# Patient Record
Sex: FEMALE | Race: WHITE | URBAN - NONMETROPOLITAN AREA
[De-identification: names, ages, dates, MRNs, and addresses within clinical notes are randomized per-mention and may not be internally consistent; named-entity substitution may affect disease eponyms.]

---
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
## 2024-10-02 ENCOUNTER — PROCEDURE VISIT (OUTPATIENT)
Dept: OBGYN CLINIC | Age: 19
End: 2024-10-02
Payer: COMMERCIAL

## 2024-10-02 ENCOUNTER — HOSPITAL ENCOUNTER (OUTPATIENT)
Age: 19
Setting detail: SPECIMEN
Discharge: HOME | End: 2024-10-02

## 2024-10-02 ENCOUNTER — TELEPHONE (OUTPATIENT)
Dept: OBGYN CLINIC | Age: 19
End: 2024-10-02

## 2024-10-02 VITALS — WEIGHT: 140 LBS | SYSTOLIC BLOOD PRESSURE: 112 MMHG | BODY MASS INDEX: 23.3 KG/M2 | DIASTOLIC BLOOD PRESSURE: 72 MMHG

## 2024-10-02 DIAGNOSIS — N92.0 MENORRHAGIA WITH REGULAR CYCLE: ICD-10-CM

## 2024-10-02 DIAGNOSIS — Z01.419 WOMEN'S ANNUAL ROUTINE GYNECOLOGICAL EXAMINATION: Primary | ICD-10-CM

## 2024-10-02 PROCEDURE — 99395 PREV VISIT EST AGE 18-39: CPT | Performed by: NURSE PRACTITIONER

## 2024-10-02 PROCEDURE — G8484 FLU IMMUNIZE NO ADMIN: HCPCS | Performed by: NURSE PRACTITIONER

## 2024-10-02 RX ORDER — ESCITALOPRAM OXALATE 5 MG/1
5 TABLET ORAL DAILY
COMMUNITY
Start: 2024-07-11

## 2024-10-02 RX ORDER — BUSPIRONE HYDROCHLORIDE 5 MG/1
5 TABLET ORAL 3 TIMES DAILY PRN
COMMUNITY
Start: 2024-03-15

## 2024-10-02 ASSESSMENT — ENCOUNTER SYMPTOMS
ABDOMINAL PAIN: 0
CONSTIPATION: 0
DIARRHEA: 0
SHORTNESS OF BREATH: 0

## 2024-10-02 NOTE — PROGRESS NOTES
YEARLY PHYSICAL    Date of service: 10/2/2024    Trevor De La Cruz  Is a 19 y.o. female    PT's PCP is: Gurvinder Lopez DO     : 2005                                         Chaperone for Intimate Exam  Chaperone was offered as part of the rooming process. Patient declined and agrees to continue with exam without a chaperone.  Chaperone: n/a      Subjective:       Patient's last menstrual period was 2024 (approximate).     Are your menses regular: yes    OB History    Para Term  AB Living   0 0 0 0 0 0   SAB IAB Ectopic Molar Multiple Live Births   0 0 0 0 0 0        Social History     Tobacco Use   Smoking Status Never   Smokeless Tobacco Never        Social History     Substance and Sexual Activity   Alcohol Use Never       Family History   Problem Relation Age of Onset    Diabetes Maternal Grandmother     Hypertension Maternal Grandmother     Stroke Maternal Grandmother     Heart Disease Maternal Grandfather     Breast Cancer Maternal Aunt     Colon Cancer Maternal Aunt     Diabetes Maternal Aunt        Any family history of breast or ovarian cancer: Yes    Any family history of blood clots: No      Allergies: Patient has no known allergies.      Current Outpatient Medications:     escitalopram (LEXAPRO) 5 MG tablet, Take 1 tablet by mouth daily, Disp: , Rfl:     busPIRone (BUSPAR) 5 MG tablet, Take 1 tablet by mouth 3 times daily as needed, Disp: , Rfl:     NEXPLANON 68 MG implant, , Disp: , Rfl:     Social History     Substance and Sexual Activity   Sexual Activity Yes    Partners: Male    Birth control/protection: Condom       Any bleeding or pain with intercourse: No    Last Yearly:  First exam    Last pap: n/a    Last HPV: n/a    Last Mammogram: n/a    Last Dexascan n/a    Last colorectal screen- n/a    Do you do self breast exams: encouraged     History reviewed. No pertinent past medical history.    History

## 2024-10-02 NOTE — TELEPHONE ENCOUNTER
Patient desires replacement Nexplanon. Told her to complete hcg either way since will be close to expiration. Current device expires 10/26.

## 2024-10-14 LAB — CYTOLOGY REPORT: NORMAL

## 2024-10-16 NOTE — TELEPHONE ENCOUNTER
Called [facility] and spoke with Hamzah. He states covered 100 percent with no copay through patients pharmacy benefits. He states it was sent to [facility] on 10/2/24.

## 2024-10-24 NOTE — TELEPHONE ENCOUNTER
Called [facility] to check status of order. I spoke with Chely who contacted pt and got verbal authorization for shipment to our office. Device is scheduled to be delivered on 10/29/24.

## 2024-10-29 NOTE — TELEPHONE ENCOUNTER
Nexplanon device arrived in office. I spoke with pt and she is aware. She states she is due to start cycle soon and would like to wait to have remove/replace until she starts next cycle. She will call on cycle day 1. She is also aware of need for SPT the day prior to remove/replace.   negative...

## 2024-11-19 ENCOUNTER — HOSPITAL ENCOUNTER (OUTPATIENT)
Age: 19
Setting detail: SPECIMEN
Discharge: HOME | End: 2024-11-19

## 2024-11-19 DIAGNOSIS — N92.0 MENORRHAGIA WITH REGULAR CYCLE: ICD-10-CM

## 2024-11-19 LAB — B-HCG SERPL EIA 3RD IS-ACNC: <0.2 MIU/ML

## 2024-11-20 ENCOUNTER — PROCEDURE VISIT (OUTPATIENT)
Dept: OBGYN CLINIC | Age: 19
End: 2024-11-20
Payer: COMMERCIAL

## 2024-11-20 VITALS
WEIGHT: 142.6 LBS | DIASTOLIC BLOOD PRESSURE: 68 MMHG | SYSTOLIC BLOOD PRESSURE: 106 MMHG | BODY MASS INDEX: 23.76 KG/M2 | HEIGHT: 65 IN

## 2024-11-20 DIAGNOSIS — N92.0 MENORRHAGIA WITH REGULAR CYCLE: Primary | ICD-10-CM

## 2024-11-20 DIAGNOSIS — Z30.46 ENCOUNTER FOR REMOVAL AND REINSERTION OF NEXPLANON: ICD-10-CM

## 2024-11-20 PROCEDURE — 11983 REMOVE/INSERT DRUG IMPLANT: CPT | Performed by: NURSE PRACTITIONER

## 2024-11-20 RX ORDER — MELOXICAM 15 MG/1
TABLET ORAL
COMMUNITY
Start: 2024-10-23

## 2024-11-20 RX ORDER — LIDOCAINE HYDROCHLORIDE AND EPINEPHRINE BITARTRATE 20; .01 MG/ML; MG/ML
1.7 INJECTION, SOLUTION SUBCUTANEOUS ONCE
Status: COMPLETED | OUTPATIENT
Start: 2024-11-20 | End: 2024-11-20

## 2024-11-20 RX ADMIN — LIDOCAINE HYDROCHLORIDE AND EPINEPHRINE BITARTRATE 1.7 ML: 20; .01 INJECTION, SOLUTION SUBCUTANEOUS at 08:39

## 2024-11-20 RX ADMIN — LIDOCAINE HYDROCHLORIDE AND EPINEPHRINE BITARTRATE 1.7 ML: 20; .01 INJECTION, SOLUTION SUBCUTANEOUS at 08:40

## 2024-11-20 NOTE — PROGRESS NOTES
was intact and visualized by patient and I.  A sterile dressing and steri-strip was applied with a pressure wrap. The patient tolerated the procedure well.  Formal restrictions were discussed in detail. She is to notify our office if any swelling, redness, temperature, or limb restriction or numbness. No baths, Pools or Lakes until Follow up. Showers are allowed in 24 hours. She may take Tylenol for any pain.       INSERTION PROCEDURE:  The patient was positioned comfortably on our procedure table. She was consented earlier in the appointment and the procedure risk and complications were reviewed. She was marked.  A sterile prep was completed with betadine x3 swabs and a 1% lidocaine with epinephrine for local anesthetic was utilized, 1 ml. The nexplanon ted was noted within the applicator.  The Nexplanon was inserted per protocol in the left upper arm with insertion site subdermally at the inner side of the non-dominant upper arm, overlying the triceps muscle about 8-10cm from the medial epicondyle of the humerus and 3-5cm posterior to the sulcus groove between the biceps and triceps muscle.  Placement was confirmed by palpating the device by me and the patient.  A steri strip was applied.  A pressure wrap was applied. The patient tolerated the procedure well.  Formal restrictions were discussed in detail. She is to notify our office if any swelling, redness, temperature, or limb restriction or numbness. No baths, Pools or Lakes until Follow up. She may take Tylenol for any pain.     Nexplanon NDC 90870-686-15  Nexplanon Lot # [id number]  Nexplanon Expires 10/2026  Nexplanon  [employer]    Assessment:   Diagnosis Orders   1. Menorrhagia with regular cycle  etonogestrel (NEXPLANON) implant 68 mg      2. Encounter for removal and reinsertion of Nexplanon  lidocaine-EPINEPHrine 2%-1:276004 injection 1.7 mL    lidocaine-EPINEPHrine 2%-1:337769 injection 1.7 mL        There are no problems to display for this

## 2024-11-20 NOTE — PROGRESS NOTES
local anesthetic was utilized, 1 ml. The nexplanon ted was noted within the applicator.  The Nexplanon was inserted per protocol in the *** with insertion site subdermally at the inner side of the non-dominant upper arm, overlying the triceps muscle about 8-10cm from the medial epicondyle of the humerus and 3-5cm posterior to the sulcus groove between the biceps and triceps muscle.  Placement was confirmed by palpating the device by me and the patient.  A steri strip was applied.  A pressure wrap was applied. The patient tolerated the procedure well.  Formal restrictions were discussed in detail. She is to notify our office if any swelling, redness, temperature, or limb restriction or numbness. No baths, Pools or Lakes until Follow up. She may take Tylenol for any pain.     Nexplanon NDC ***  Nexplanon Lot # ***  Nexplanon Expires ***  Nexplanon  ***    Assessment:   Diagnosis Orders   1. Menorrhagia with regular cycle        2. Nexplanon insertion          There are no problems to display for this patient.        Plan:  No follow-ups on file.

## 2025-03-12 ENCOUNTER — TELEPHONE (OUTPATIENT)
Dept: OBGYN CLINIC | Age: 20
End: 2025-03-12

## 2025-03-12 SDOH — ECONOMIC STABILITY: FOOD INSECURITY: WITHIN THE PAST 12 MONTHS, THE FOOD YOU BOUGHT JUST DIDN'T LAST AND YOU DIDN'T HAVE MONEY TO GET MORE.: NEVER TRUE

## 2025-03-12 SDOH — ECONOMIC STABILITY: TRANSPORTATION INSECURITY
IN THE PAST 12 MONTHS, HAS THE LACK OF TRANSPORTATION KEPT YOU FROM MEDICAL APPOINTMENTS OR FROM GETTING MEDICATIONS?: NO

## 2025-03-12 SDOH — ECONOMIC STABILITY: FOOD INSECURITY: WITHIN THE PAST 12 MONTHS, YOU WORRIED THAT YOUR FOOD WOULD RUN OUT BEFORE YOU GOT MONEY TO BUY MORE.: NEVER TRUE

## 2025-03-12 SDOH — ECONOMIC STABILITY: TRANSPORTATION INSECURITY
IN THE PAST 12 MONTHS, HAS LACK OF TRANSPORTATION KEPT YOU FROM MEETINGS, WORK, OR FROM GETTING THINGS NEEDED FOR DAILY LIVING?: NO

## 2025-03-12 SDOH — ECONOMIC STABILITY: INCOME INSECURITY: IN THE LAST 12 MONTHS, WAS THERE A TIME WHEN YOU WERE NOT ABLE TO PAY THE MORTGAGE OR RENT ON TIME?: NO

## 2025-03-12 ASSESSMENT — PATIENT HEALTH QUESTIONNAIRE - PHQ9
1. LITTLE INTEREST OR PLEASURE IN DOING THINGS: NOT AT ALL
SUM OF ALL RESPONSES TO PHQ QUESTIONS 1-9: 0
SUM OF ALL RESPONSES TO PHQ QUESTIONS 1-9: 0
SUM OF ALL RESPONSES TO PHQ9 QUESTIONS 1 & 2: 0
SUM OF ALL RESPONSES TO PHQ QUESTIONS 1-9: 0
1. LITTLE INTEREST OR PLEASURE IN DOING THINGS: NOT AT ALL
2. FEELING DOWN, DEPRESSED OR HOPELESS: NOT AT ALL
2. FEELING DOWN, DEPRESSED OR HOPELESS: NOT AT ALL
SUM OF ALL RESPONSES TO PHQ QUESTIONS 1-9: 0

## 2025-03-12 NOTE — TELEPHONE ENCOUNTER
Patient called stating that she had her Nexplanon removed and replaced on 11/20/24.  She is having a similar experience with this current Nexplanon as she did with her last, but is noticing some mild intermittent breast tenderness and has gained approximately 20 lbs.  Her weight has been very consistent previously.  She is also almost 2 weeks on her cycle, but bleeding is very light.  Patient is concerned with the rapid weight gain and would like to come in for evaluation.  Appointment scheduled on 3/13 with Vida.

## 2025-03-13 ENCOUNTER — OFFICE VISIT (OUTPATIENT)
Dept: OBGYN CLINIC | Age: 20
End: 2025-03-13
Payer: COMMERCIAL

## 2025-03-13 VITALS — DIASTOLIC BLOOD PRESSURE: 70 MMHG | WEIGHT: 151.6 LBS | BODY MASS INDEX: 25.23 KG/M2 | SYSTOLIC BLOOD PRESSURE: 120 MMHG

## 2025-03-13 DIAGNOSIS — R63.5 WEIGHT GAIN: Primary | ICD-10-CM

## 2025-03-13 DIAGNOSIS — Z30.46 ENCOUNTER FOR SURVEILLANCE OF NEXPLANON SUBDERMAL CONTRACEPTIVE: ICD-10-CM

## 2025-03-13 PROCEDURE — 1036F TOBACCO NON-USER: CPT

## 2025-03-13 PROCEDURE — G8427 DOCREV CUR MEDS BY ELIG CLIN: HCPCS

## 2025-03-13 PROCEDURE — 99213 OFFICE O/P EST LOW 20 MIN: CPT

## 2025-03-13 PROCEDURE — G8419 CALC BMI OUT NRM PARAM NOF/U: HCPCS

## 2025-03-13 SDOH — ECONOMIC STABILITY: FOOD INSECURITY: WITHIN THE PAST 12 MONTHS, THE FOOD YOU BOUGHT JUST DIDN'T LAST AND YOU DIDN'T HAVE MONEY TO GET MORE.: NEVER TRUE

## 2025-03-13 SDOH — ECONOMIC STABILITY: FOOD INSECURITY: WITHIN THE PAST 12 MONTHS, YOU WORRIED THAT YOUR FOOD WOULD RUN OUT BEFORE YOU GOT MONEY TO BUY MORE.: NEVER TRUE

## 2025-03-19 ASSESSMENT — ENCOUNTER SYMPTOMS
VOMITING: 0
NAUSEA: 0

## 2025-03-20 NOTE — PROGRESS NOTES
more quickly.  Aware that nexplanon can cause small amount of weight gain, could be worsened by decreased activity level d/t foot injury.  Hopeful to get back to normal activity level soon to be able to work on weight loss, wellness goals.  Reassured that BMI is currently within healthy range.    No orders of the defined types were placed in this encounter.    No follow-ups on file.       Electronically signed by Carmella Serra PA-C on 03/19/25